# Patient Record
Sex: FEMALE | Race: WHITE | NOT HISPANIC OR LATINO | URBAN - METROPOLITAN AREA
[De-identification: names, ages, dates, MRNs, and addresses within clinical notes are randomized per-mention and may not be internally consistent; named-entity substitution may affect disease eponyms.]

---

## 2018-02-26 ENCOUNTER — OFFICE VISIT - RIVER FALLS (OUTPATIENT)
Dept: FAMILY MEDICINE | Facility: CLINIC | Age: 43
End: 2018-02-26

## 2018-02-26 ASSESSMENT — MIFFLIN-ST. JEOR: SCORE: 1433.51

## 2019-03-25 ENCOUNTER — OFFICE VISIT - RIVER FALLS (OUTPATIENT)
Dept: FAMILY MEDICINE | Facility: CLINIC | Age: 44
End: 2019-03-25

## 2019-03-25 LAB — DEPRECATED S PYO AG THROAT QL EIA: POSITIVE

## 2019-03-25 ASSESSMENT — MIFFLIN-ST. JEOR: SCORE: 1432.6

## 2020-04-20 ENCOUNTER — OFFICE VISIT - RIVER FALLS (OUTPATIENT)
Dept: FAMILY MEDICINE | Facility: CLINIC | Age: 45
End: 2020-04-20

## 2022-02-12 VITALS
BODY MASS INDEX: 25.49 KG/M2 | TEMPERATURE: 97.1 F | WEIGHT: 168.2 LBS | HEART RATE: 69 BPM | HEIGHT: 68 IN | OXYGEN SATURATION: 99 %

## 2022-02-12 VITALS
DIASTOLIC BLOOD PRESSURE: 78 MMHG | HEART RATE: 85 BPM | TEMPERATURE: 100.7 F | SYSTOLIC BLOOD PRESSURE: 118 MMHG | WEIGHT: 168 LBS | OXYGEN SATURATION: 99 % | HEIGHT: 68 IN | BODY MASS INDEX: 25.46 KG/M2

## 2022-02-15 NOTE — TELEPHONE ENCOUNTER
Entered by Edwige James CMA on October 28, 2020 12:56:41 PM CDT  ---------------------  From: Edwige James CMA   To: [facility]    Sent: 10/28/2020 12:56:41 PM CDT  Subject: Medication Management     ** Submitted: **  Order:SUMAtriptan (SUMAtriptan 100 mg oral tablet)  See Instructions  ONE (1) TAB(S) ORAL ONCE AS NEEDED :FOR MIGRAINE HEADACHE  Qty:  9 tab(s)        Days Supply:  15        Refills:  1          Substitutions Allowed     Route To Pharmacy [facility]    Signed by Edwige James CMA  10/28/2020 5:56:00 PM UT    ** Submitted: **  Complete:SUMAtriptan (SUMAtriptan 100 mg oral tablet)   Signed by Edwige James CMA  10/28/2020 5:56:00 PM UT    ** Not Approved:  **  SUMAtriptan (SUMATRIPTAN 100MG)  ONE (1) TAB(S) ORAL ONCE AS NEEDED :FOR MIGRAINE HEADACHE  Qty:  9 tab(s)        Days Supply:  15        Refills:  0          Substitutions Allowed     Route To Pharmacy [facility]   Signed by Edwige James CMA            ------------------------------------------  From: [facility]  To: Emerson Del Rio MD  Sent: October 28, 2020 9:59:20 AM CDT  Subject: Medication Management  Due: October 8, 2020 2:04:31 PM CDT     ** On Hold Pending Signature **     Drug: SUMAtriptan (SUMAtriptan 100 mg oral tablet), ONE (1) TAB(S) ORAL ONCE AS NEEDED :FOR MIGRAINE HEADACHE  Quantity: 9 tab(s)  Days Supply: 15  Refills: 0  Substitutions Allowed  Notes from Pharmacy:     Dispensed Drug: SUMAtriptan (SUMAtriptan 100 mg oral tablet), ONE (1) TAB(S) ORAL ONCE AS NEEDED :FOR MIGRAINE HEADACHE  Quantity: 9 tab(s)  Days Supply: 15  Refills: 0  Substitutions Allowed  Notes from Pharmacy:  ------------------------------------------

## 2022-02-15 NOTE — NURSING NOTE
Rapid Strep POC Entered On:  3/25/2019 10:08 AM CDT    Performed On:  3/25/2019 10:07 AM CDT by Kay Rivera               Rapid Strep POC   Rapid Strep POC :   Positive   Kay Rivera - 3/25/2019 10:07 AM CDT   Details   Collection Date :   3/25/2019 10:02 AM CDT   Handling Specimen POC :   throat   POC Test Comments :       Lab test performed by:  [facility]  [address] [place] [address]  Phone # [phone]  Fax # [phone]     Kay Rivera - 3/25/2019 10:07 AM CDT

## 2022-02-15 NOTE — TELEPHONE ENCOUNTER
---------------------  From: Lynn Garcia LPN (Phone Messages Pool ([id number]_[facility]))   To: Phone Messages Pool (32224_[place] [facility]);     Sent: 3/20/2019 11:08:45 AM CDT  Subject: General Message       Phone Message Template      PCP:   GUERRERO      Time of Call:  0954       Person Calling:  pt  Phone number:  [phone] LDM    Returned call at: _    Note:   Patient called asking for a new prescription of Valacyclovir and extra refills. She has a current out break on her lip. Last sent 05/09/17 #4 with 5 refills. Can send prescription to [facility]   Please advise.     Last office visit and reason:  02/26/18, sinusitis.

## 2022-02-15 NOTE — PROGRESS NOTES
Patient:   MELANIE RODRIGUEZ            MRN: [id number]            FIN: [id number]               Age:   42 years     Sex:  Female     :  1975   Associated Diagnoses:   Sinusitis   Author:   Emerson Del Rio MD      Impression and Plan   Diagnosis     Sinusitis ([id number]-BV J01.41).     Course:  Worsening.    Orders     Orders   Charges (Evaluation and Management):  21309 office outpatient visit 15 minutes (Charge) (Order): Quantity: 1, Sinusitis.     Orders (Selected)   Prescriptions  Prescribed  Augmentin 875 mg-125 mg oral tablet: 1 tab(s), po, bidac, # 20 tab(s), 0 Refill(s), Type: Maintenance, Pharmacy: [facility], 1 tab(s) po bidac,x10 day(s)  Diflucan 100 mg oral tablet: 1 tab(s) ( 100 mg ), PO, Daily, # 5 tab(s), 0 Refill(s), Type: Maintenance, Pharmacy: [facility], 1 tab(s) po daily,x5 day(s).        Visit Information      Date of Service: 2018 12:59 pm  Performing Location: [facility]  Encounter#: [id number]      Primary Care Provider (PCP):  Emerson Del Rio MD    NPI# [id number]   Visit type:  New symptom.    Accompanied by:  No one.    Source of history:  Self.    History limitation:  None.       Chief Complaint   Chief complaint discussed and confirmed correct.     2018 1:05 PM CST    Pt here for sinus pressure/pain        History of Present Illness             The patient presents with sinus problem.  The sinus problem is located in the frontal sinus, ethmoid sinus and maxillary sinus.  The sinus problem is characterized by nasal congestion, headache and facial pain.  The severity of the sinus problem is moderate.  The sinus problem is constant.  The sinus problem has lasted for 6 day(s).  The context of the sinus problem: occurred in association with illness.  Associated symptoms consist of none.        Review of Systems   Constitutional:  Negative.    Eye:  Negative.    Ear/Nose/Mouth/Throat:  Nasal congestion, Sinus pain.    Respiratory:  Negative.     Cardiovascular:  Negative.    Gastrointestinal:  Negative.    Genitourinary:  Negative.    Hematology/Lymphatics:  Negative.    Endocrine:  Negative.    Immunologic:  Negative.    Musculoskeletal:  Negative.    Integumentary:  Negative.    Neurologic:  Negative.    Psychiatric:  Negative.          All other systems reviewed and negative      Health Status   Allergies:    Allergic Reactions (Selected)  No Known Medication Allergies   Medications:  (Selected)   Prescriptions  Prescribed  Augmentin 875 mg-125 mg oral tablet: 1 tab(s), po, bidac, # 20 tab(s), 0 Refill(s), Type: Maintenance, Pharmacy: [facility], 1 tab(s) po bidac,x10 day(s)  Diflucan 100 mg oral tablet: 1 tab(s) ( 100 mg ), PO, Daily, # 5 tab(s), 0 Refill(s), Type: Maintenance, Pharmacy: [facility], 1 tab(s) po daily,x5 day(s)  Valtrex 1 g oral tablet: 2 tab(s) ( 2 gm ), PO, q12 hrs, # 4 tab(s), 5 Refill(s), Type: Maintenance, Pharmacy: [facility], 2 tab(s) po q12 hrs,x1 day(s)   Problem list:    All Problems  Hypovitaminosis D / SNOMED CT 50625463 / Confirmed  Major depression / SNOMED CT 846370898 / Confirmed      Histories   Past Medical History:    No active or resolved past medical history items have been selected or recorded.   Family History:       Procedure history:    No active procedure history items have been selected or recorded.   Social History:        Alcohol Assessment            Current, Beer (12 oz), 1-2 times per week      Tobacco Assessment            Past      Substance Abuse Assessment            Never      Employment and Education Assessment            Employed      Home and Environment Assessment            Marital status: .  Spouse/Partner name: Jorje.      Nutrition and Health Assessment            Type of diet: Regular.      Exercise and Physical Activity Assessment            Exercise frequency: somtimes.  Exercise type: Walking.        Physical Examination   Vital signs reviewed  and within  acceptable limits    Vital Signs   2/26/2018 1:05 PM CST Temperature Tympanic 97.1 DegF  LOW    Peripheral Pulse Rate 69 bpm    Oxygen Saturation 99 %      Measurements from flowsheet : Measurements   2/26/2018 1:05 PM CST Height Measured - Standard 67.5 in    Weight Measured - Standard 168.2 lb    BSA 1.9 m2    Body Mass Index 25.95 kg/m2  HI      General:  Alert and oriented, No acute distress.    Eye:  Pupils are equal, round and reactive to light, Extraocular movements are intact, Normal conjunctiva.    HENT:  Normocephalic, Tympanic membranes are clear, Normal hearing, Oral mucosa is moist, No pharyngeal erythema.         Sinus: Bilateral, Ethmoid sinus, Frontal sinus, Maxillary sinus, Tenderness.    Neck:  Supple, Non-tender, No lymphadenopathy.    Respiratory:  Lungs are clear to auscultation, Respirations are non-labored, Breath sounds are equal.    Cardiovascular:  Normal rate, Regular rhythm, No murmur, Normal peripheral perfusion, No edema.    Integumentary:  Warm, Dry, Pink.    Psychiatric:  Cooperative, Appropriate mood & affect, Normal judgment.

## 2022-02-15 NOTE — NURSING NOTE
Depression Screening Entered On:  3/25/2019 11:30 AM CDT    Performed On:  3/25/2019 11:30 AM CDT by Edwige James CMA               Depression Screening   Feeling Down, Depressed, Hopeless :   Not at all   Little Interest - Pleasure in Activities :   Not at all   Initial Depression Screen Score :   0    Trouble Falling or Staying Asleep :   Not at all   Feeling Tired or Little Energy :   Not at all   Poor Appetite or Overeating :   Not at all   Feeling Bad About Yourself :   Not at all   Trouble Concentrating :   Not at all   Moving or Speaking Slowly :   Not at all   Thoughts Better Off Dead or Hurting Self :   Not at all   Detailed Depression Screen Score :   0    Total Depression Screen Score :   0    KARYN Difficulty with Work, Home, Others :   Not difficult at all   Edwige James CMA - 3/25/2019 11:30 AM CDT

## 2022-02-15 NOTE — PROGRESS NOTES
Patient:   MELANIE RODRIGUEZ            MRN: [id number]            FIN: [id number]               Age:   44 years     Sex:  Female     :  1975   Associated Diagnoses:   Migraine   Author:   Emerson Del Rio MD      Impression and Plan   Diagnosis     Migraine ([id number] G43.909).     Course:  Worsening.    Orders     Orders   Charges (Evaluation and Management):  90975 physician telephone evaluation 11-20 min (Charge) (Order): Quantity: 1, Migraine.     Orders (Selected)   Prescriptions  Prescribed  SUMAtriptan 100 mg oral tablet: = 1 tab(s) ( 100 mg ), Oral, once, PRN: for migraine headache, # 9 tab(s), 1 Refill(s), Type: Soft Stop, Pharmacy: [facility], 1 tab(s) Oral once,PRN:for migraine headache.        Visit Information      Date of Service: 2020 07:43 am  Performing Location: [facility]  Encounter#: [id number]      Primary Care Provider (PCP):  Emerson Del Rio MD    NPI# [id number]   Visit type:  Telephone Encounter.    Source of history:  Self.    Location of patient:  _home  Call Start Time:   _840  Call End Time:    _0900   Referral source:  Self.    History limitation:  None.       Chief Complaint   Patient needs routine refill of regular medications   2020 8:28 AM CDT    Consent given for telephone visit to discuss migraines     _      History of Present Illness   Today's visit was conducted via telephone due to the COVID-19 pandemic.     Reason for visit:  _Personal history of occassional migraines.  Wants Rx for Sumatriptan since it works well for son.  Has tried it in the past and it was effective without any side effects.      Review of Systems   Constitutional:  Negative.    Eye:  Negative.    Ear/Nose/Mouth/Throat:  Negative.    Respiratory:  Negative.    Cardiovascular:  Negative.    Gastrointestinal:  Negative.    Genitourinary:  Negative.    Hematology/Lymphatics:  Negative.    Endocrine:  Negative.    Immunologic:  Negative.    Musculoskeletal:  Negative.     Integumentary:  Negative.    Neurologic:  Headache.    Psychiatric:  Negative.    All other systems reviewed and negative      Health Status   Allergies:    Allergic Reactions (Selected)  No Known Medication Allergies   Medications:  (Selected)   Prescriptions  Prescribed  SUMAtriptan 100 mg oral tablet: = 1 tab(s) ( 100 mg ), Oral, once, PRN: for migraine headache, # 9 tab(s), 1 Refill(s), Type: Soft Stop, Pharmacy: [facility], 1 tab(s) Oral once,PRN:for migraine headache  Valtrex 1 g oral tablet: = 2 tab(s) ( 2 gm ), PO, q12 hrs, # 4 tab(s), 5 Refill(s), Type: Maintenance, Pharmacy: [facility], 2 tab(s) Oral q12 hrs,x1 day(s),    Medications          *denotes recorded medication          SUMAtriptan 100 mg oral tablet: 100 mg, 1 tab(s), Oral, once, PRN: for migraine headache, 9 tab(s), 1 Refill(s).          Valtrex 1 g oral tablet: 2 gm, 2 tab(s), PO, q12 hrs, for 1 day(s), 4 tab(s), 5 Refill(s).       Problem list:    All Problems  Hypovitaminosis D / SNOMED CT 39703940 / Confirmed  Major depression / SNOMED CT 388389678 / Confirmed      Histories   Past Medical History:    No active or resolved past medical history items have been selected or recorded.   Family History:       Procedure history:    No active procedure history items have been selected or recorded.   Social History:        Alcohol Assessment            Current, Beer (12 oz), 1-2 times per week      Tobacco Assessment            Past      Substance Abuse Assessment            Never      Employment and Education Assessment            Employed      Home and Environment Assessment            Marital status: .  Spouse/Partner name: Jorje.      Nutrition and Health Assessment            Type of diet: Regular.      Exercise and Physical Activity Assessment            Exercise frequency: somtimes.  Exercise type: Walking.        Physical Examination   General:  Alert and oriented, No acute distress.    Respiratory:  Respirations  are non-labored.    Psychiatric:  Cooperative, Appropriate mood & affect, Normal judgment.

## 2022-02-15 NOTE — NURSING NOTE
CAGE Assessment Entered On:  3/25/2019 11:30 AM CDT    Performed On:  3/25/2019 11:30 AM CDT by Edwige James CMA               Assessment   Have you ever felt you should cut down on your drinking :   No   Have people annoyed you by criticizing your drinking :   No   Have you ever felt bad or guilty about your drinking :   No   Have you ever taken a drink first thing in the morning to steady your nerves or get rid of a hangover (Eye-opener) :   No   CAGE Score :   0    Edwige James CMA - 3/25/2019 11:30 AM CDT

## 2022-02-15 NOTE — NURSING NOTE
Comprehensive Intake Entered On:  3/25/2019 9:47 AM CDT    Performed On:  3/25/2019 9:38 AM CDT by Edwige James CMA               Summary   Chief Complaint :   Pt here for body aches   Weight Measured :   168 lb(Converted to: 168 lb 0 oz, 76.20 kg)    Height Measured :   67.5 in(Converted to: 5 ft 7 in, 171.45 cm)    Body Mass Index :   25.92 kg/m2 (HI)    Body Surface Area :   1.9 m2   Systolic Blood Pressure :   118 mmHg   Diastolic Blood Pressure :   78 mmHg   Mean Arterial Pressure :   91 mmHg   Peripheral Pulse Rate :   85 bpm   Temperature Tympanic :   100.7 DegF(Converted to: 38.2 DegC)  (HI)    Oxygen Saturation :   99 %   Edwige James CMA - 3/25/2019 9:38 AM CDT   Health Status   Allergies Verified? :   Yes   Medication History Verified? :   Yes   Medical History Verified? :   Yes   Pre-Visit Planning Status :   Completed   Tobacco Use? :   Never smoker   Edwige James CMA - 3/25/2019 9:38 AM CDT   Consents   Consent for Immunization Exchange :   Consent Granted   Consent for Immunizations to Providers :   Consent Granted   Edwige James CMA - 3/25/2019 9:38 AM CDT   Meds / Allergies   (As Of: 3/25/2019 9:47:05 AM CDT)   Allergies (Active)   No Known Medication Allergies  Estimated Onset Date:   Unspecified ; Created By:   Faustina Green CMA; Reaction Status:   Active ; Category:   Drug ; Substance:   No Known Medication Allergies ; Type:   Allergy ; Updated By:   Faustina Green CMA; Reviewed Date:   3/25/2019 9:39 AM CDT        Medication List   (As Of: 3/25/2019 9:47:05 AM CDT)   Prescription/Discharge Order    amoxicillin-clavulanate  :   amoxicillin-clavulanate ; Status:   Completed ; Ordered As Mnemonic:   Augmentin 875 mg-125 mg oral tablet ; Simple Display Line:   1 tab(s), po, bidac, for 10 day(s), 20 tab(s), 0 Refill(s) ; Ordering Provider:   Emerson Del Rio MD; Catalog Code:   amoxicillin-clavulanate ; Order Dt/Tm:   2/26/2018 1:14:49 PM          fluconazole  :   fluconazole ; Status:    Completed ; Ordered As Mnemonic:   Diflucan 100 mg oral tablet ; Simple Display Line:   100 mg, 1 tab(s), PO, Daily, for 5 day(s), 5 tab(s), 0 Refill(s) ; Ordering Provider:   Emerson Del Rio MD; Catalog Code:   fluconazole ; Order Dt/Tm:   2/26/2018 1:14:57 PM          valACYclovir  :   valACYclovir ; Status:   Prescribed ; Ordered As Mnemonic:   Valtrex 1 g oral tablet ; Simple Display Line:   2 gm, 2 tab(s), PO, q12 hrs, for 1 day(s), 4 tab(s), 5 Refill(s) ; Ordering Provider:   Emerson Del Rio MD; Catalog Code:   valACYclovir ; Order Dt/Tm:   3/20/2019 1:37:02 PM

## 2022-02-15 NOTE — NURSING NOTE
Comprehensive Intake Entered On:  4/20/2020 8:29 AM CDT    Performed On:  4/20/2020 8:28 AM CDT by Edwige James CMA               Summary   Chief Complaint :   Consent given for telephone visit to discuss migraines   Jacob Edwige FULLER - 4/20/2020 8:28 AM CDT   Health Status   Allergies Verified? :   Yes   Medication History Verified? :   Yes   Medical History Verified? :   Yes   Tobacco Use? :   Never smoker   James Edwige FULLER - 4/20/2020 8:28 AM CDT   Consents   Consent for Immunization Exchange :   Consent Granted   Consent for Immunizations to Providers :   Consent Granted   Jacob Edwige FULLER - 4/20/2020 8:28 AM CDT   Meds / Allergies   (As Of: 4/20/2020 8:29:33 AM CDT)   Allergies (Active)   No Known Medication Allergies  Estimated Onset Date:   Unspecified ; Created By:   Faustina Green CMA; Reaction Status:   Active ; Category:   Drug ; Substance:   No Known Medication Allergies ; Type:   Allergy ; Updated By:   Faustina Green CMA; Reviewed Date:   4/20/2020 8:29 AM CDT        Medication List   (As Of: 4/20/2020 8:29:33 AM CDT)   Prescription/Discharge Order    amoxicillin  :   amoxicillin ; Status:   Processing ; Ordered As Mnemonic:   amoxicillin 500 mg oral tablet ; Ordering Provider:   Emerson Del Rio MD; Action Display:   Complete ; Catalog Code:   amoxicillin ; Order Dt/Tm:   4/20/2020 8:29:20 AM CDT          fluconazole  :   fluconazole ; Status:   Processing ; Ordered As Mnemonic:   Diflucan 100 mg oral tablet ; Ordering Provider:   Emerson Del Rio MD; Action Display:   Complete ; Catalog Code:   fluconazole ; Order Dt/Tm:   4/20/2020 8:29:22 AM CDT          valACYclovir  :   valACYclovir ; Status:   Prescribed ; Ordered As Mnemonic:   Valtrex 1 g oral tablet ; Simple Display Line:   2 gm, 2 tab(s), PO, q12 hrs, for 1 day(s), 4 tab(s), 5 Refill(s) ; Ordering Provider:   Emerson Del Rio MD; Catalog Code:   valACYclovir ; Order Dt/Tm:   3/11/2020 12:22:33 PM CDT

## 2022-02-15 NOTE — PROGRESS NOTES
Patient:   MELANIE RODRIGUEZ            MRN: [id number]            FIN: [id number]               Age:   43 years     Sex:  Female     :  1975   Associated Diagnoses:   Strep throat   Author:   Emerson Del Rio MD      Impression and Plan   Diagnosis     Strep throat (PKQ39-IX J02.0).     Course:  Worsening.    Orders     Orders   Charges (Evaluation and Management):  72028 office outpatient visit 15 minutes (Charge) (Order): Quantity: 1, Strep throat.     Orders (Selected)   Outpatient Orders  Ordered  Rapid Strep Test (Request): Priority: Urgent, Sore throat  Order  Strep Grp A AG  IA (Rapid Strep) (Request): Priority: Urgent, Strep throat  Prescriptions  Prescribed  Diflucan 100 mg oral tablet: = 1 tab(s) ( 100 mg ), PO, Daily, # 7 tab(s), 0 Refill(s), Type: Maintenance, Pharmacy: [facility], 1 tab(s) Oral daily,x7 day(s)  amoxicillin 500 mg oral tablet: = 1 tab(s) ( 500 mg ), PO, TID, # 30 tab(s), 0 Refill(s), Type: Maintenance, Pharmacy: [facility], 1 tab(s) Oral tid,x10 day(s).        Visit Information      Date of Service: 2019 09:40 am  Performing Location: [facility]  Encounter#: [id number]      Primary Care Provider (PCP):  Emerson Del Rio MD    NPI# [id number]   Visit type:  New symptom.    Accompanied by:  No one.    Source of history:  Self.    History limitation:  None.       Chief Complaint   Chief complaint discussed and confirmed correct.     3/25/2019 9:38 AM CDT    Pt here for body aches        History of Present Illness             The patient presents with a sore throat.  The sore throat is described as scratchy and aching.  The severity of the sore throat is severe.  The timing/course of the sore throat is constant.  The context of the sore throat: occurred in association with illness.  Associated symptoms consist of difficulty swallowing.        Review of Systems   Constitutional:  Negative.    Eye:  Negative.    Ear/Nose/Mouth/Throat:  Sore throat.     Respiratory:  Negative.    Cardiovascular:  Negative.    Gastrointestinal:  Negative.    Genitourinary:  Negative.    Hematology/Lymphatics:  Negative.    Endocrine:  Negative.    Immunologic:  Negative.    Musculoskeletal:  Negative.    Integumentary:  Negative.    Neurologic:  Negative.    Psychiatric:  Negative.    All other systems reviewed and negative      Health Status   Allergies:    Allergic Reactions (Selected)  No Known Medication Allergies   Medications:  (Selected)   Prescriptions  Prescribed  Diflucan 100 mg oral tablet: = 1 tab(s) ( 100 mg ), PO, Daily, # 7 tab(s), 0 Refill(s), Type: Maintenance, Pharmacy: [facility], 1 tab(s) Oral daily,x7 day(s)  Valtrex 1 g oral tablet: = 2 tab(s) ( 2 gm ), PO, q12 hrs, # 4 tab(s), 5 Refill(s), Type: Maintenance, Pharmacy: [facility], 2 tab(s) Oral q12 hrs,x1 day(s)  amoxicillin 500 mg oral tablet: = 1 tab(s) ( 500 mg ), PO, TID, # 30 tab(s), 0 Refill(s), Type: Maintenance, Pharmacy: [facility], 1 tab(s) Oral tid,x10 day(s)   Problem list:    All Problems  Hypovitaminosis D / SNOMED CT 06003667 / Confirmed  Major depression / SNOMED CT 692167741 / Confirmed      Histories   Past Medical History:    No active or resolved past medical history items have been selected or recorded.   Family History:       Procedure history:    No active procedure history items have been selected or recorded.   Social History:        Alcohol Assessment            Current, Beer (12 oz), 1-2 times per week      Tobacco Assessment            Past      Substance Abuse Assessment            Never      Employment and Education Assessment            Employed      Home and Environment Assessment            Marital status: .  Spouse/Partner name: Jorje.      Nutrition and Health Assessment            Type of diet: Regular.      Exercise and Physical Activity Assessment            Exercise frequency: somtimes.  Exercise type: Walking.      Physical Examination    Vital signs reviewed  and within acceptable limits    Vital Signs   3/25/2019 9:38 AM CDT Temperature Tympanic 100.7 DegF  HI    Peripheral Pulse Rate 85 bpm    Systolic Blood Pressure 118 mmHg    Diastolic Blood Pressure 78 mmHg    Mean Arterial Pressure 91 mmHg    Oxygen Saturation 99 %      Measurements from flowsheet : Measurements   3/25/2019 9:38 AM CDT Height Measured - Standard 67.5 in    Weight Measured - Standard 168 lb    BSA 1.9 m2    Body Mass Index 25.92 kg/m2  HI      General:  Alert and oriented, No acute distress.    Eye:  Pupils are equal, round and reactive to light, Extraocular movements are intact, Normal conjunctiva.    HENT:  Normocephalic, Tympanic membranes are clear, Normal hearing, Oral mucosa is moist.         Mouth: Within normal limits.         Throat: Uvula ( Within normal limits ), Tonsils ( Bilateral tonsils, Erythematous ), Pharynx ( Posterior, Erythematous ).    Neck:  Supple, Non-tender, No lymphadenopathy.    Respiratory:  Lungs are clear to auscultation, Respirations are non-labored, Breath sounds are equal.    Cardiovascular:  Normal rate, Regular rhythm, No murmur, Normal peripheral perfusion, No edema.    Integumentary:  Warm, Dry, Pink.    Psychiatric:  Cooperative, Appropriate mood & affect, Normal judgment.       Review / Management   Results review:  Lab results: 3/25/2019 10:07 AM CDT   Rapid Strep POC           Positive  .